# Patient Record
Sex: MALE | Race: WHITE | NOT HISPANIC OR LATINO | Employment: FULL TIME | URBAN - METROPOLITAN AREA
[De-identification: names, ages, dates, MRNs, and addresses within clinical notes are randomized per-mention and may not be internally consistent; named-entity substitution may affect disease eponyms.]

---

## 2017-07-13 ENCOUNTER — OFFICE VISIT (OUTPATIENT)
Dept: MEDICAL GROUP | Facility: MEDICAL CENTER | Age: 47
End: 2017-07-13
Payer: COMMERCIAL

## 2017-07-13 VITALS
DIASTOLIC BLOOD PRESSURE: 72 MMHG | HEART RATE: 68 BPM | WEIGHT: 199 LBS | OXYGEN SATURATION: 97 % | BODY MASS INDEX: 23.02 KG/M2 | HEIGHT: 78 IN | RESPIRATION RATE: 16 BRPM | SYSTOLIC BLOOD PRESSURE: 128 MMHG

## 2017-07-13 DIAGNOSIS — H69.91 EUSTACHIAN TUBE DYSFUNCTION, RIGHT: ICD-10-CM

## 2017-07-13 DIAGNOSIS — G89.29 CHRONIC FOOT PAIN, LEFT: ICD-10-CM

## 2017-07-13 DIAGNOSIS — M79.672 CHRONIC FOOT PAIN, LEFT: ICD-10-CM

## 2017-07-13 DIAGNOSIS — E78.5 DYSLIPIDEMIA: ICD-10-CM

## 2017-07-13 PROCEDURE — 99214 OFFICE O/P EST MOD 30 MIN: CPT | Performed by: NURSE PRACTITIONER

## 2017-07-13 RX ORDER — FLUTICASONE PROPIONATE 50 MCG
2 SPRAY, SUSPENSION (ML) NASAL DAILY
Qty: 16 G | Refills: 11 | Status: SHIPPED | OUTPATIENT
Start: 2017-07-13 | End: 2021-01-06

## 2017-07-13 RX ORDER — METHYLPREDNISOLONE 4 MG/1
TABLET ORAL
Qty: 21 TAB | Refills: 0 | Status: SHIPPED | OUTPATIENT
Start: 2017-07-13 | End: 2021-01-06

## 2017-07-13 ASSESSMENT — PATIENT HEALTH QUESTIONNAIRE - PHQ9: CLINICAL INTERPRETATION OF PHQ2 SCORE: 0

## 2017-07-13 NOTE — PROGRESS NOTES
"Subjective:      Chalo Mcneil is a 47 y.o. male who presents with Otalgia            Otalgia     Chalo Mcneil is here today for complaints of ear pain and foot pain.      1. Eustachian tube dysfunction, right  Patient states approximately 3 weeks ago he developed a \"weird\" feeling in his right ear. He states sometimes it has been painful although he states over the past 1-2 days and has improved. He has not tried anything over-the-counter for symptoms. He denies discharge from the ears or hearing loss. There may be some sinus congestion or postnasal drip. He denies fever, chills, cough or shortness of breath.    2. Chronic foot pain, left  Patient was seen in urgent care on 9/16 for left foot trauma. He was diagnosed with a closed, nondisplaced fracture of the second metacarpal bone of the left foot. He states he still is having pain in this area and it has impacted on his ability to exercise and would like to see a foot doctor for this.    3. Dyslipidemia  Patient previously had mild elevation in LDL cholesterol and is due for repeat lab work.    Social History   Substance Use Topics   • Smoking status: Never Smoker    • Smokeless tobacco: Never Used   • Alcohol Use: Yes      Comment: occas     Current Outpatient Prescriptions   Medication Sig Dispense Refill   • fluticasone (FLONASE) 50 MCG/ACT nasal spray Spray 2 Sprays in nose every day. 16 g 11   • MethylPREDNISolone (MEDROL DOSEPAK) 4 MG Tablet Therapy Pack As directed on the packaging label. 21 Tab 0   • aspirin (ASA) 325 MG Tab Take 325 mg by mouth every 6 hours as needed.       No current facility-administered medications for this visit.     Past Medical History   Diagnosis Date   • ASTHMA      childhood   • Seasonal allergies      fall   • Infertile male syndrome      Family History   Problem Relation Age of Onset   • Cancer Father      prostate   • Heart Disease Paternal Grandmother    • Cancer Paternal Grandfather      lung   • Diabetes Neg Hx    • Stroke " "Neg Hx    • Other Mother      healthy       Review of Systems   HENT: Positive for ear pain.    Musculoskeletal: Positive for joint pain.   All other systems reviewed and are negative.         Objective:     /72 mmHg  Pulse 68  Resp 16  Ht 2.007 m (6' 7.02\")  Wt 90.266 kg (199 lb)  BMI 22.41 kg/m2  SpO2 97%     Physical Exam   Constitutional: He is oriented to person, place, and time. He appears well-developed and well-nourished. No distress.   HENT:   Head: Normocephalic and atraumatic.   Right Ear: External ear normal. Tympanic membrane is injected and retracted.   Left Ear: External ear normal. Tympanic membrane is injected and retracted.   Nose: Nose normal.   Mouth/Throat: Oropharynx is clear and moist.   Eyes: Conjunctivae are normal. Right eye exhibits no discharge. Left eye exhibits no discharge.   Neck: Normal range of motion. Neck supple. No tracheal deviation present. No thyromegaly present.   Cardiovascular: Normal rate, regular rhythm and normal heart sounds.    No murmur heard.  Pulmonary/Chest: Effort normal and breath sounds normal. No respiratory distress. He has no wheezes. He has no rales.   Lymphadenopathy:     He has no cervical adenopathy.   Neurological: He is alert and oriented to person, place, and time. Coordination normal.   Skin: Skin is warm and dry. No rash noted. He is not diaphoretic. No erythema.   Psychiatric: He has a normal mood and affect. His behavior is normal. Judgment and thought content normal.   Nursing note and vitals reviewed.              Assessment/Plan:     1. Eustachian tube dysfunction, right  Patient is improving somewhat so I will have to first try Flonase nasal spray and possibly an over-the-counter antihistamine. If it does not start improving or worsens he is to start on the Medrol Dosepak. I do not see otitis media.  - fluticasone (FLONASE) 50 MCG/ACT nasal spray; Spray 2 Sprays in nose every day.  Dispense: 16 g; Refill: 11  - MethylPREDNISolone " (MEDROL DOSEPAK) 4 MG Tablet Therapy Pack; As directed on the packaging label.  Dispense: 21 Tab; Refill: 0    2. Chronic foot pain, left  Patient has been having pain in his foot for about 10 months now so I will refer him to podiatry for further evaluation.  - REFERRAL TO PODIATRY    3. Dyslipidemia  I advised patient to do repeat lab work and then if his LDL continues to be running above 140, I will discuss with him the pros and cons of going on statins.  - COMP METABOLIC PANEL; Future  - LIPID PROFILE; Future

## 2017-07-13 NOTE — MR AVS SNAPSHOT
"        Chalo Mcneil   2017 10:20 AM   Office Visit   MRN: [id number]    Department:  [address] [place] [address]   Dept Phone:  [phone]    Description:  Male : 1970   Provider:  RA[facility] Loco           Reason for Visit     Otalgia x 1 week      Allergies as of 2017     No Known Allergies      You were diagnosed with     Eustachian tube dysfunction, right   [617618]       Chronic foot pain, left   [549550]       Dyslipidemia   [055866]         Vital Signs     Blood Pressure Pulse Respirations Height Weight Body Mass Index    128/72 mmHg 68 16 2.007 m (6' 7.02\") 90.266 kg (199 lb) 22.41 kg/m2    Oxygen Saturation Smoking Status                97% Never Smoker           Basic Information     Date Of Birth Sex Race Ethnicity Preferred Language    1970 Male White Non- English      Problem List              ICD-10-CM Priority Class Noted - Resolved    Infertile male syndrome E34.52   Unknown - Present    Dyslipidemia E78.5   2017 - Present      Health Maintenance        Date Due Completion Dates    IMM DTaP/Tdap/Td Vaccine (1 - Tdap) 1989 ---    IMM INFLUENZA (1) 2017 ---            Current Immunizations     No immunizations on file.      Below and/or attached are the medications your provider expects you to take. Review all of your home medications and newly ordered medications with your provider and/or pharmacist. Follow medication instructions as directed by your provider and/or pharmacist. Please keep your medication list with you and share with your provider. Update the information when medications are discontinued, doses are changed, or new medications (including over-the-counter products) are added; and carry medication information at all times in the event of emergency situations     Allergies:  No Known Allergies          Medications  Valid as of: 2017 - 10:44 AM    Generic Name Brand Name Tablet Size Instructions for use    Aspirin (Tab)  MG Take " 325 mg by mouth every 6 hours as needed.        Fluticasone Propionate (Suspension) FLONASE 50 MCG/ACT Spray 2 Sprays in nose every day.        MethylPREDNISolone (Tablet Therapy Pack) MEDROL DOSEPAK 4 MG As directed on the packaging label.        .                 Medicines prescribed today were sent to:     [facility][id number] - [place] - [address] [place] [address]    Phone: [phone] Fax: [phone]    Open 24 Hours?: No      Medication refill instructions:       If your prescription bottle indicates you have medication refills left, it is not necessary to call your provider’s office. Please contact your pharmacy and they will refill your medication.    If your prescription bottle indicates you do not have any refills left, you may request refills at any time through one of the following ways: The online [employer] system (except Urgent Care), by calling your provider’s office, or by asking your pharmacy to contact your provider’s office with a refill request. Medication refills are processed only during regular business hours and may not be available until the next business day. Your provider may request additional information or to have a follow-up visit with you prior to refilling your medication.   *Please Note: Medication refills are assigned a new Rx number when refilled electronically. Your pharmacy may indicate that no refills were authorized even though a new prescription for the same medication is available at the pharmacy. Please request the medicine by name with the pharmacy before contacting your provider for a refill.        Your To Do List     Future Labs/Procedures Complete By Expires    COMP METABOLIC PANEL  As directed 7/14/2018    LIPID PROFILE  As directed 7/14/2018      Referral     A referral request has been sent to our patient care coordination department. Please allow 3-5 business days for us to process this request and contact you either by phone or mail. If you do not  hear from us by the 5th business day, please call us at [phone].           [email or web address] Access Code: Activation code not generated  Current [email or web address] Status: Active

## 2018-09-19 ENCOUNTER — OFFICE VISIT (OUTPATIENT)
Dept: URGENT CARE | Facility: CLINIC | Age: 48
End: 2018-09-19
Payer: COMMERCIAL

## 2018-09-19 VITALS
WEIGHT: 212 LBS | HEART RATE: 84 BPM | SYSTOLIC BLOOD PRESSURE: 110 MMHG | OXYGEN SATURATION: 96 % | RESPIRATION RATE: 16 BRPM | DIASTOLIC BLOOD PRESSURE: 76 MMHG | TEMPERATURE: 98.1 F | HEIGHT: 78 IN | BODY MASS INDEX: 24.53 KG/M2

## 2018-09-19 DIAGNOSIS — S01.01XA LACERATION OF SCALP, INITIAL ENCOUNTER: ICD-10-CM

## 2018-09-19 PROCEDURE — 90471 IMMUNIZATION ADMIN: CPT | Performed by: PHYSICIAN ASSISTANT

## 2018-09-19 PROCEDURE — 99213 OFFICE O/P EST LOW 20 MIN: CPT | Mod: 25 | Performed by: PHYSICIAN ASSISTANT

## 2018-09-19 PROCEDURE — 90715 TDAP VACCINE 7 YRS/> IM: CPT | Performed by: PHYSICIAN ASSISTANT

## 2018-09-19 RX ORDER — AZITHROMYCIN 250 MG/1
TABLET, FILM COATED ORAL
Qty: 6 TAB | Refills: 1 | Status: SHIPPED | OUTPATIENT
Start: 2018-09-19 | End: 2021-01-06

## 2018-09-19 ASSESSMENT — ENCOUNTER SYMPTOMS
EYES NEGATIVE: 1
NEUROLOGICAL NEGATIVE: 1
MUSCULOSKELETAL NEGATIVE: 1
CONSTITUTIONAL NEGATIVE: 1

## 2018-09-19 NOTE — PROGRESS NOTES
"Subjective:      Chalo Mcneil is a 48 y.o. male who presents with Scalp Laceration (xtoday, scalp laceration)            Laceration    The incident occurred 1 to 3 hours ago (hit in top scalp w/branch, bleeding; no LOC;no major head trama (prob glancing blow from stick); no other neuro sx). The laceration is located on the scalp. The laceration is 1 cm in size. The laceration mechanism was a blunt object. The pain is moderate. The pain has been constant since onset. He reports no foreign bodies present. His tetanus status is out of date.       Review of Systems   Constitutional: Negative.    HENT: Negative.    Eyes: Negative.    Musculoskeletal: Negative.    Skin: Negative.    Neurological: Negative.           Objective:     /76 (BP Location: Left arm, Patient Position: Sitting, BP Cuff Size: Large adult)   Pulse 84   Temp 36.7 °C (98.1 °F) (Temporal)   Resp 16   Ht 2.007 m (6' 7\")   Wt 96.2 kg (212 lb)   SpO2 96%   BMI 23.88 kg/m²      Physical Exam   Constitutional: He is oriented to person, place, and time. He appears well-developed and well-nourished. No distress.   HENT:   Head: Normocephalic.   Right Ear: External ear normal.   Left Ear: External ear normal.   Nose: Nose normal.   Top r scalp, no bleeding; cleaned off dried bld/hair w/hibiclens; , has ~1cm superfic.lac, not open, does not need closed; abx oint    Eyes: Pupils are equal, round, and reactive to light. EOM are normal.   Neck: Normal range of motion. Neck supple.   Neurological: He is alert and oriented to person, place, and time.   Skin: Skin is warm and dry. Capillary refill takes less than 2 seconds. No erythema.   Psychiatric: He has a normal mood and affect. His behavior is normal.   Nursing note and vitals reviewed.    Active Ambulatory Problems     Diagnosis Date Noted   • Infertile male syndrome    • Dyslipidemia 07/13/2017     Resolved Ambulatory Problems     Diagnosis Date Noted   • No Resolved Ambulatory Problems     Past " Medical History:   Diagnosis Date   • ASTHMA    • Infertile male syndrome    • Seasonal allergies      Current Outpatient Prescriptions on File Prior to Visit   Medication Sig Dispense Refill   • fluticasone (FLONASE) 50 MCG/ACT nasal spray Spray 2 Sprays in nose every day. 16 g 11   • MethylPREDNISolone (MEDROL DOSEPAK) 4 MG Tablet Therapy Pack As directed on the packaging label. 21 Tab 0   • aspirin (ASA) 325 MG Tab Take 325 mg by mouth every 6 hours as needed.       No current facility-administered medications on file prior to visit.      Social History     Social History   • Marital status:      Spouse name: N/A   • Number of children: N/A   • Years of education: N/A     Occupational History   • Not on file.     Social History Main Topics   • Smoking status: Never Smoker   • Smokeless tobacco: Never Used   • Alcohol use Yes      Comment: occas   • Drug use: No   • Sexual activity: Yes     Partners: Female      Comment: , public healtlh prof     Other Topics Concern   • Not on file     Social History Narrative   • No narrative on file     Family History   Problem Relation Age of Onset   • Cancer Father         prostate   • Other Mother         healthy   • Heart Disease Paternal Grandmother    • Cancer Paternal Grandfather         lung   • Diabetes Neg Hx    • Stroke Neg Hx      Patient has no known allergies.              Assessment/Plan:     ·  superficial scalp lac      · Local wound care; rx meds; rtc prn if infected  · No head CT indic, no neuro sx/ha

## 2020-11-23 ENCOUNTER — HOSPITAL ENCOUNTER (OUTPATIENT)
Dept: LAB | Facility: MEDICAL CENTER | Age: 50
End: 2020-11-23
Attending: PEDIATRICS
Payer: COMMERCIAL

## 2020-11-23 LAB — COVID ORDER STATUS COVID19: NORMAL

## 2020-12-29 ENCOUNTER — NURSE TRIAGE (OUTPATIENT)
Dept: HEALTH INFORMATION MANAGEMENT | Facility: OTHER | Age: 50
End: 2020-12-29

## 2020-12-29 NOTE — TELEPHONE ENCOUNTER
Pt reports an increased heart rate for the past several weeks, I feel fine during the day time. I wake up with a surge of adrenaline and feel like my chest doesn't settle down, I feel like I can hear my heart beat in my ears. I had panic attacks several years ago, but it feels similar. Heart rate= 96.  Wife has been sick with many emergencies, I just thought I was worried, I keep thinking it will get better, but its been a month. This morning I started feeling an allergy like congestion/cough. Pt states he got tested for covid around thanksgiving because wife tested positive, family quarantined and waited a week to get tested, the whole family tested negative. Pt screened, [facility]    1. Caller Name: Chalo Mcneil                 Call Back Number: [phone]  [facility] PCP or Specialty Provider: Yes Rishabh Corona        2.  Has the patient previously tested positive for COVID-19? No    3.  In the last two weeks, has the patient had any new or worsening symptoms (not explained by alternative diagnosis)? No.    4.  Does patient have any comoribidities? None     5.  Has the patient had any known contact with someone who is suspected or confirmed to have COVID-19? no    Reason for Disposition  • Problems with anxiety or stress    Additional Information  • Negative: Passed out (i.e., lost consciousness, collapsed and was not responding)  • Negative: Shock suspected (e.g., cold/pale/clammy skin, too weak to stand, low BP, rapid pulse)  • Negative: Difficult to awaken or acting confused (e.g., disoriented, slurred speech)  • Negative: Visible sweat on face or sweat dripping down face  • Negative: Unable to walk, or can only walk with assistance (e.g., requires support)  • Negative: [1] Received SHOCK from implantable cardiac defibrillator AND [2] persisting symptoms (i.e., palpitations, lightheadedness)  • Negative: Sounds like a life-threatening emergency to the triager  • Negative: Chest pain  •  "Negative: Difficulty breathing  • Negative: Dizziness, lightheadedness, or weakness  • Negative: [1] Heart beating very rapidly (e.g., > 140 / minute) AND [2] present now  (Exception: during exercise)  • Negative: Heart beating very slowly (e.g., < 50 / minute)  (Exception: athlete)  • Negative: New or worsened shortness of breath with activity (dyspnea on exertion)  • Negative: Patient sounds very sick or weak to the triager  • Negative: [1] Heart beating very rapidly (e.g., > 140 / minute) AND [2] not present now  (Exception: during exercise)  • Negative: [1] Skipped or extra beat(s) AND [2] increases with exercise or exertion  • Negative: [1] Skipped or extra beat(s) AND [2] occurs 4 or more times per minute  • Negative: New or worsened ankle swelling  • Negative: History of heart disease  (i.e., heart attack, bypass surgery, angina, angioplasty, CHF) (Exception: brief heart beat symptoms that went away and now feels well)  • Negative: Age > 60 years (Exception: brief heart beat symptoms that went away and now feels well)  • Negative: Taking water pill (i.e., diuretic) or heart medication (e.g., digoxin)  • Negative: Wearing a \"holter monitor\" or \"cardiac event monitor\"  • Negative: [1] Received SHOCK from implantable cardiac defibrillator AND [2] now feels well  • Negative: History of hyperthyroidism or taking thyroid medication  • Negative: Known or suspected substance abuse (e.g., cocaine, alcohol abuse)  • Negative: [1] Palpitations AND [2] no improvement after using CARE ADVICE    Answer Assessment - Initial Assessment Questions  1. DESCRIPTION: \"Please describe your heart rate or heart beat that you are having\" (e.g., fast/slow, regular/irregular, skipped or extra beats, \"palpitations\")      At night, for the past month, I have been feeling like my heart is racing, and then it continues when I wake up.  2. ONSET: \"When did it start?\" (Minutes, hours or days)       Month ago  3. DURATION: \"How long does it " "last\" (e.g., seconds, minutes, hours)      Over night  4. PATTERN \"Does it come and go, or has it been constant since it started?\"  \"Does it get worse with exertion?\"   \"Are you feeling it now?\"      Constant for the past month at night, but does not feel any of this during the day.    5. TAP: \"Using your hand, can you tap out what you are feeling on a chair or table in front of you, so that I can hear?\" (Note: not all patients can do this)        Pt thinks the heart can be faint and then hard at times  6. HEART RATE: \"Can you tell me your heart rate?\" \"How many beats in 15 seconds?\"  (Note: not all patients can do this)        96  7. RECURRENT SYMPTOM: \"Have you ever had this before?\" If so, ask: \"When was the last time?\" and \"What happened that time?\"       About 10 years ago.  8. CAUSE: \"What do you think is causing the palpitations?\"      Not sure, my wife has been having a few emergencies this month  9. CARDIAC HISTORY: \"Do you have any history of heart disease?\" (e.g., heart attack, angina, bypass surgery, angioplasty, arrhythmia)       no  10. OTHER SYMPTOMS: \"Do you have any other symptoms?\" (e.g., dizziness, chest pain, sweating, difficulty breathing)        Sometimes I feel tightness or labored breathing at night  11. PREGNANCY: \"Is there any chance you are pregnant?\" \"When was your last menstrual period?\"        no    Protocols used: HEART RATE AND HEARTBEAT [id number]      "

## 2020-12-30 ENCOUNTER — TELEPHONE (OUTPATIENT)
Dept: MEDICAL GROUP | Facility: PHYSICIAN GROUP | Age: 50
End: 2020-12-30

## 2020-12-30 NOTE — TELEPHONE ENCOUNTER
ESTABLISHED PATIENT PRE-VISIT PLANNING     Patient was NOT contacted to complete PVP.     Note: Patient will not be contacted if there is no indication to call.     1.  Reviewed notes from the last few office visits within the medical group: Yes, 01/06/2021    2.  If any orders were placed at last visit or intended to be done for this visit (i.e. 6 mos follow-up), do we have Results/Consult Notes?         •  Labs - Labs were not ordered at last office visit.  Note: If patient appointment is for lab review and patient did not complete labs, check with provider if OK to reschedule patient until labs completed.       •  Imaging - Imaging was not ordered at last office visit.       •  Referrals - No referrals were ordered at last office visit.    3. Is this appointment scheduled as a Hospital Follow-Up? No    4.  Immunizations were updated in Epic using Reconcile Outside Information activity? Yes    5.  Patient is due for the following Health Maintenance Topics:   Health Maintenance Due   Topic Date Due   • COLONOSCOPY  01/22/2020   • IMM ZOSTER VACCINES (1 of 2) 01/22/2020     6.  AHA (Pulse8) form printed for Provider? N/A

## 2021-01-06 ENCOUNTER — OFFICE VISIT (OUTPATIENT)
Dept: MEDICAL GROUP | Facility: PHYSICIAN GROUP | Age: 51
End: 2021-01-06
Payer: COMMERCIAL

## 2021-01-06 VITALS
TEMPERATURE: 97.2 F | OXYGEN SATURATION: 98 % | SYSTOLIC BLOOD PRESSURE: 122 MMHG | HEART RATE: 100 BPM | DIASTOLIC BLOOD PRESSURE: 72 MMHG | RESPIRATION RATE: 16 BRPM | BODY MASS INDEX: 25.34 KG/M2 | WEIGHT: 219 LBS | HEIGHT: 78 IN

## 2021-01-06 DIAGNOSIS — Z91.09 ENVIRONMENTAL ALLERGIES: ICD-10-CM

## 2021-01-06 DIAGNOSIS — R00.2 HEART PALPITATIONS: ICD-10-CM

## 2021-01-06 DIAGNOSIS — Z76.89 ENCOUNTER TO ESTABLISH CARE: ICD-10-CM

## 2021-01-06 DIAGNOSIS — Z12.11 SCREENING FOR COLORECTAL CANCER: ICD-10-CM

## 2021-01-06 DIAGNOSIS — E34.52 INFERTILE MALE SYNDROME: ICD-10-CM

## 2021-01-06 DIAGNOSIS — Z13.0 SCREENING FOR ENDOCRINE, METABOLIC AND IMMUNITY DISORDER: ICD-10-CM

## 2021-01-06 DIAGNOSIS — Z13.228 SCREENING FOR ENDOCRINE, METABOLIC AND IMMUNITY DISORDER: ICD-10-CM

## 2021-01-06 DIAGNOSIS — Z12.12 SCREENING FOR COLORECTAL CANCER: ICD-10-CM

## 2021-01-06 DIAGNOSIS — E78.5 DYSLIPIDEMIA: ICD-10-CM

## 2021-01-06 DIAGNOSIS — Z13.29 SCREENING FOR ENDOCRINE, METABOLIC AND IMMUNITY DISORDER: ICD-10-CM

## 2021-01-06 PROCEDURE — 99214 OFFICE O/P EST MOD 30 MIN: CPT | Performed by: NURSE PRACTITIONER

## 2021-01-06 SDOH — ECONOMIC STABILITY: HOUSING INSECURITY
IN THE LAST 12 MONTHS, WAS THERE A TIME WHEN YOU DID NOT HAVE A STEADY PLACE TO SLEEP OR SLEPT IN A SHELTER (INCLUDING NOW)?: NO

## 2021-01-06 SDOH — SOCIAL STABILITY: SOCIAL NETWORK: HOW OFTEN DO YOU GET TOGETHER WITH FRIENDS OR RELATIVES?: PATIENT DECLINED

## 2021-01-06 SDOH — ECONOMIC STABILITY: TRANSPORTATION INSECURITY
IN THE PAST 12 MONTHS, HAS THE LACK OF TRANSPORTATION KEPT YOU FROM MEDICAL APPOINTMENTS OR FROM GETTING MEDICATIONS?: NO

## 2021-01-06 SDOH — ECONOMIC STABILITY: INCOME INSECURITY: IN THE LAST 12 MONTHS, WAS THERE A TIME WHEN YOU WERE NOT ABLE TO PAY THE MORTGAGE OR RENT ON TIME?: NO

## 2021-01-06 SDOH — SOCIAL STABILITY: SOCIAL NETWORK: ARE YOU MARRIED, WIDOWED, DIVORCED, SEPARATED, NEVER MARRIED, OR LIVING WITH A PARTNER?: MARRIED

## 2021-01-06 SDOH — HEALTH STABILITY: MENTAL HEALTH: HOW OFTEN DO YOU HAVE 6 OR MORE DRINKS ON ONE OCCASION?: LESS THAN MONTHLY

## 2021-01-06 SDOH — HEALTH STABILITY: MENTAL HEALTH: HOW OFTEN DO YOU HAVE A DRINK CONTAINING ALCOHOL?: 2-4 TIMES A MONTH

## 2021-01-06 SDOH — ECONOMIC STABILITY: TRANSPORTATION INSECURITY
IN THE PAST 12 MONTHS, HAS LACK OF TRANSPORTATION KEPT YOU FROM MEETINGS, WORK, OR FROM GETTING THINGS NEEDED FOR DAILY LIVING?: NO

## 2021-01-06 SDOH — ECONOMIC STABILITY: HOUSING INSECURITY: IN THE LAST 12 MONTHS, HOW MANY PLACES HAVE YOU LIVED?: 1

## 2021-01-06 SDOH — SOCIAL STABILITY: SOCIAL NETWORK
DO YOU BELONG TO ANY CLUBS OR ORGANIZATIONS SUCH AS CHURCH GROUPS UNIONS, FRATERNAL OR ATHLETIC GROUPS, OR SCHOOL GROUPS?: YES

## 2021-01-06 SDOH — ECONOMIC STABILITY: INCOME INSECURITY: HOW HARD IS IT FOR YOU TO PAY FOR THE VERY BASICS LIKE FOOD, HOUSING, MEDICAL CARE, AND HEATING?: NOT HARD AT ALL

## 2021-01-06 SDOH — ECONOMIC STABILITY: FOOD INSECURITY: WITHIN THE PAST 12 MONTHS, THE FOOD YOU BOUGHT JUST DIDN'T LAST AND YOU DIDN'T HAVE MONEY TO GET MORE.: NEVER TRUE

## 2021-01-06 SDOH — HEALTH STABILITY: PHYSICAL HEALTH: ON AVERAGE, HOW MANY DAYS PER WEEK DO YOU ENGAGE IN MODERATE TO STRENUOUS EXERCISE (LIKE A BRISK WALK)?: 1 DAY

## 2021-01-06 SDOH — ECONOMIC STABILITY: FOOD INSECURITY: WITHIN THE PAST 12 MONTHS, YOU WORRIED THAT YOUR FOOD WOULD RUN OUT BEFORE YOU GOT MONEY TO BUY MORE.: NEVER TRUE

## 2021-01-06 SDOH — HEALTH STABILITY: MENTAL HEALTH
STRESS IS WHEN SOMEONE FEELS TENSE, NERVOUS, ANXIOUS, OR CAN'T SLEEP AT NIGHT BECAUSE THEIR MIND IS TROUBLED. HOW STRESSED ARE YOU?: VERY MUCH

## 2021-01-06 SDOH — HEALTH STABILITY: MENTAL HEALTH: HOW MANY STANDARD DRINKS CONTAINING ALCOHOL DO YOU HAVE ON A TYPICAL DAY?: 1 OR 2

## 2021-01-06 SDOH — HEALTH STABILITY: PHYSICAL HEALTH: ON AVERAGE, HOW MANY MINUTES DO YOU ENGAGE IN EXERCISE AT THIS LEVEL?: 30 MIN

## 2021-01-06 SDOH — HEALTH STABILITY: PHYSICAL HEALTH: ON AVERAGE, HOW MANY MINUTES DO YOU ENGAGE IN EXERCISE AT THIS LEVEL?: 30 MINUTES

## 2021-01-06 SDOH — SOCIAL STABILITY: SOCIAL NETWORK: HOW OFTEN DO YOU ATTENT MEETINGS OF THE CLUB OR ORGANIZATION YOU BELONG TO?: MORE THAN 4 TIMES PER YEAR

## 2021-01-06 SDOH — ECONOMIC STABILITY: TRANSPORTATION INSECURITY
IN THE PAST 12 MONTHS, HAS LACK OF RELIABLE TRANSPORTATION KEPT YOU FROM MEDICAL APPOINTMENTS, MEETINGS, WORK OR FROM GETTING THINGS NEEDED FOR DAILY LIVING?: NO

## 2021-01-06 SDOH — SOCIAL STABILITY: SOCIAL NETWORK
IN A TYPICAL WEEK, HOW MANY TIMES DO YOU TALK ON THE PHONE WITH FAMILY, FRIENDS, OR NEIGHBORS?: MORE THAN THREE TIMES A WEEK

## 2021-01-06 SDOH — SOCIAL STABILITY: SOCIAL NETWORK: HOW OFTEN DO YOU ATTEND CHURCH OR RELIGIOUS SERVICES?: 1 TO 4 TIMES PER YEAR

## 2021-01-06 ASSESSMENT — SOCIAL DETERMINANTS OF HEALTH (SDOH)
HOW OFTEN DO YOU GET TOGETHER WITH FRIENDS OR RELATIVES?: DECLINE
HOW OFTEN DO YOU HAVE A DRINK CONTAINING ALCOHOL: 2-3 TIMES A WEEK
WITHIN THE PAST 12 MONTHS, YOU WORRIED THAT YOUR FOOD WOULD RUN OUT BEFORE YOU GOT THE MONEY TO BUY MORE: NEVER TRUE
HOW MANY DRINKS CONTAINING ALCOHOL DO YOU HAVE ON A TYPICAL DAY WHEN YOU ARE DRINKING: 1 OR 2
WITHIN THE PAST 12 MONTHS, THE FOOD YOU BOUGHT JUST DIDN'T LAST AND YOU DIDN'T HAVE MONEY TO GET MORE: NEVER TRUE
IN A TYPICAL WEEK, HOW MANY TIMES DO YOU TALK ON THE PHONE WITH FAMILY, FRIENDS, OR NEIGHBORS?: MORE THAN THREE TIMES A WEEK
HOW OFTEN DO YOU ATTENT MEETINGS OF THE CLUB OR ORGANIZATION YOU BELONG TO?: MORE THAN 4 TIMES PER YEAR
HOW OFTEN DO YOU HAVE SIX OR MORE DRINKS ON ONE OCCASION: LESS THAN MONTHLY
DO YOU BELONG TO ANY CLUBS OR ORGANIZATIONS SUCH AS CHURCH GROUPS UNIONS, FRATERNAL OR ATHLETIC GROUPS, OR SCHOOL GROUPS?: YES
HOW OFTEN DO YOU ATTEND CHURCH OR RELIGIOUS SERVICES?: 1 TO 4 TIMES PER YEAR
HOW HARD IS IT FOR YOU TO PAY FOR THE VERY BASICS LIKE FOOD, HOUSING, MEDICAL CARE, AND HEATING?: NOT HARD AT ALL

## 2021-01-06 ASSESSMENT — PATIENT HEALTH QUESTIONNAIRE - PHQ9: CLINICAL INTERPRETATION OF PHQ2 SCORE: 0

## 2021-01-06 NOTE — PROGRESS NOTES
Subjective:     CC:  Diagnoses of Encounter to establish care, Heart palpitations, Dyslipidemia, Environmental allergies, Infertile male syndrome, Screening for colorectal cancer, and Screening for endocrine, metabolic and immunity disorder were pertinent to this visit.    HISTORY OF THE PRESENT ILLNESS: Patient is a 50 y.o. male. This pleasant patient is here today to establish care and discuss the following. His prior PCP was NII Bailey.    Dyslipidemia  Chronic medical problem. He is not on medication. He is diet controlled.  He is due for updated labs.  Last lab results:  Results for LETICIA TANNER (MRN [id number]) as of 1/6/2021 14:15   Ref. Range 2/22/2016 10:08   Cholesterol,Tot Latest Ref Range: 100 - 199 mg/dL 236 (H)   Triglycerides Latest Ref Range: 0 - 149 mg/dL 132   HDL Latest Ref Range: >=40 mg/dL 66   LDL Latest Ref Range: <100 mg/dL 144 (H)       Heart palpitations  New problem to examiner. For the last month he has noticed that he feels his heart pounding.  This occurs intermittently.  It occurs both with lying down and with exertion.  He has not been seen for this.  He denies chest pain, shortness of breath, dizziness, headaches, diaphoresis, left arm pain, or jaw pain.     Infertile male syndrome  Chronic medical problem.  He has had to use for his 2 children.    Environmental allergies  Chronic medical problem.  He takes fluticasone as needed.  He tries to avoid allergens.  No acute complaints today.      Allergies: Patient has no known allergies.    Current Outpatient Medications Ordered in Epic   Medication Sig Dispense Refill   • aspirin (ASA) 325 MG Tab Take 325 mg by mouth every 6 hours as needed.       No current Epic-ordered facility-administered medications on file.        Past Medical History:   Diagnosis Date   • ASTHMA     childhood   • Infertile male syndrome    • Seasonal allergies     fall       Past Surgical History:   Procedure Laterality Date   • HYDROCELECTOMY CHILD       "bilat   • VARICOCELECTOMY         Social History     Tobacco Use   • Smoking status: Never Smoker   • Smokeless tobacco: Never Used   Substance Use Topics   • Alcohol use: Yes     Frequency: 2-4 times a month     Drinks per session: 1 or 2     Binge frequency: Less than monthly     Comment: occas   • Drug use: No       Social History     Social History Narrative   • Not on file       Family History   Problem Relation Age of Onset   • Cancer Father 65        prostate   • Other Mother         healthy   • Heart Disease Paternal Grandmother    • Cancer Paternal Grandfather         lung   • No Known Problems Sister    • Diabetes Neg Hx    • Stroke Neg Hx        Health Maintenance: Due for shingles vaccine and colon cancer screening.    ROS:   Gen: no fevers/chills, no changes in weight  Eyes: no changes in vision  ENT: no sore throat, no ear pain  Pulm: no sob, no cough  CV: no chest pain, + palpitations  GI: no nausea/vomiting, no diarrhea  : no dysuria  MSk: no myalgias  Skin: no rash  Neuro: no headaches, no dizziness  Objective:     Vital signs reviewed  Exam: /72 (BP Location: Left arm, Patient Position: Sitting, BP Cuff Size: Adult)   Pulse 100   Temp 36.2 °C (97.2 °F) (Temporal)   Resp 16   Ht 2.007 m (6' 7\")   Wt 99.3 kg (219 lb)   SpO2 98%  Body mass index is 24.67 kg/m².    General: Normal appearing. No distress.  HENT: Normocephalic. Ears normal shape and contour, canals are clear bilaterally, tympanic membranes are benign.   Eyes: Eyes conjunctiva clear lids without ptosis, pupils equal and reactive to light accommodation, lids normal.  Glasses in place.  Neck: Supple without JVD. Thyroid is not enlarged.  Pulmonary: Clear to ausculation.  Normal effort. No rales, ronchi, or wheezing.  Cardiovascular: Regular rate and rhythm without murmur. Radial pulses are intact and equal bilaterally.  Abdomen: Soft, nontender, nondistended.  Neurologic: Grossly nonfocal  Lymph: No cervical or supraclavicular " lymph nodes are palpable  Skin: Warm and dry.  No obvious lesions.  Musculoskeletal: Normal gait. No extremity cyanosis, clubbing, or edema.  Psych: Normal mood and affect. Alert and oriented x3. Judgment and insight is normal.      Assessment & Plan:   50 y.o. male with the following -    1. Encounter to establish care  New problem to examiner. Care established.     2. Heart palpitations  New problem to examiner.  Check labs as below, rule out any anemia, thyroid abnormality, electrolyte deficiency, or kidney abnormality.  Referral placed to cardiology.  He is not having any acute chest pain or complaints today.  Red flags discussed with strict ER precautions, he verbalized understanding.  Follow-up pending labs.  Monitor and follow.  - Comp Metabolic Panel; Future  - TSH WITH REFLEX TO FT4; Future  - REFERRAL TO CARDIOLOGY    3. Dyslipidemia  New problem to examiner.  Continue diet and lifestyle modifications.  Due for updated labs as below, orders placed.  Monitor and follow.  - Lipid Profile; Future  - CBC WITH DIFFERENTIAL; Future  - Comp Metabolic Panel; Future    4. Environmental allergies  New problem to examiner.  Continue to avoid allergens.  Continue to use fluticasone if needed.  No acute complaints today.  Monitor and follow.    5. Infertile male syndrome  New problem to examiner.  No acute complaints today.  Monitor.    6. Screening for colorectal cancer  New problem to examiner.  Screening indicated.  Patient is in agreement.  Orders placed.  Monitor and follow.  - OCCULT BLOOD FECES IMMUNOASSAY; Future    7. Screening for endocrine, metabolic and immunity disorder  New problem to examiner.  Screening indicated.  Patient is in agreement.  Orders placed.  Monitor and follow.  - VITAMIN D,25 HYDROXY; Future      Return for pending labs.    Please note that this dictation was created using voice recognition software. I have made every reasonable attempt to correct obvious errors, but I expect that there  are errors of grammar and possibly content that I did not discover before finalizing the note.

## 2021-01-06 NOTE — ASSESSMENT & PLAN NOTE
Chronic medical problem. He is not on medication. He is diet controlled.  He is due for updated labs.  Last lab results:  Results for LETICIA TANNER (MRN [id number]) as of 1/6/2021 14:15   Ref. Range 2/22/2016 10:08   Cholesterol,Tot Latest Ref Range: 100 - 199 mg/dL 236 (H)   Triglycerides Latest Ref Range: 0 - 149 mg/dL 132   HDL Latest Ref Range: >=40 mg/dL 66   LDL Latest Ref Range: <100 mg/dL 144 (H)

## 2021-01-06 NOTE — ASSESSMENT & PLAN NOTE
Chronic medical problem.  He takes fluticasone as needed.  He tries to avoid allergens.  No acute complaints today.

## 2021-01-06 NOTE — ASSESSMENT & PLAN NOTE
New problem to examiner. For the last month he has noticed that he feels his heart pounding.  This occurs intermittently.  It occurs both with lying down and with exertion.  He has not been seen for this.  He denies chest pain, shortness of breath, dizziness, headaches, diaphoresis, left arm pain, or jaw pain.

## 2021-01-07 ENCOUNTER — HOSPITAL ENCOUNTER (OUTPATIENT)
Dept: LAB | Facility: MEDICAL CENTER | Age: 51
End: 2021-01-07
Attending: NURSE PRACTITIONER
Payer: COMMERCIAL

## 2021-01-07 DIAGNOSIS — R00.2 HEART PALPITATIONS: ICD-10-CM

## 2021-01-07 DIAGNOSIS — Z13.29 SCREENING FOR ENDOCRINE, METABOLIC AND IMMUNITY DISORDER: ICD-10-CM

## 2021-01-07 DIAGNOSIS — Z13.228 SCREENING FOR ENDOCRINE, METABOLIC AND IMMUNITY DISORDER: ICD-10-CM

## 2021-01-07 DIAGNOSIS — Z13.0 SCREENING FOR ENDOCRINE, METABOLIC AND IMMUNITY DISORDER: ICD-10-CM

## 2021-01-07 DIAGNOSIS — E78.5 DYSLIPIDEMIA: ICD-10-CM

## 2021-01-07 LAB
ALBUMIN SERPL BCP-MCNC: 4.4 G/DL (ref 3.2–4.9)
ALBUMIN/GLOB SERPL: 1.8 G/DL
ALP SERPL-CCNC: 64 U/L (ref 30–99)
ALT SERPL-CCNC: 38 U/L (ref 2–50)
ANION GAP SERPL CALC-SCNC: 10 MMOL/L (ref 7–16)
AST SERPL-CCNC: 31 U/L (ref 12–45)
BASOPHILS # BLD AUTO: 0.5 % (ref 0–1.8)
BASOPHILS # BLD: 0.03 K/UL (ref 0–0.12)
BILIRUB SERPL-MCNC: 0.3 MG/DL (ref 0.1–1.5)
BUN SERPL-MCNC: 9 MG/DL (ref 8–22)
CALCIUM SERPL-MCNC: 9 MG/DL (ref 8.5–10.5)
CHLORIDE SERPL-SCNC: 102 MMOL/L (ref 96–112)
CHOLEST SERPL-MCNC: 234 MG/DL (ref 100–199)
CO2 SERPL-SCNC: 24 MMOL/L (ref 20–33)
CREAT SERPL-MCNC: 0.94 MG/DL (ref 0.5–1.4)
EOSINOPHIL # BLD AUTO: 0.15 K/UL (ref 0–0.51)
EOSINOPHIL NFR BLD: 2.6 % (ref 0–6.9)
ERYTHROCYTE [DISTWIDTH] IN BLOOD BY AUTOMATED COUNT: 44.5 FL (ref 35.9–50)
FASTING STATUS PATIENT QL REPORTED: NORMAL
GLOBULIN SER CALC-MCNC: 2.5 G/DL (ref 1.9–3.5)
GLUCOSE SERPL-MCNC: 100 MG/DL (ref 65–99)
HCT VFR BLD AUTO: 48.6 % (ref 42–52)
HDLC SERPL-MCNC: 58 MG/DL
HGB BLD-MCNC: 15.6 G/DL (ref 14–18)
IMM GRANULOCYTES # BLD AUTO: 0 K/UL (ref 0–0.11)
IMM GRANULOCYTES NFR BLD AUTO: 0 % (ref 0–0.9)
LDLC SERPL CALC-MCNC: 153 MG/DL
LYMPHOCYTES # BLD AUTO: 1.39 K/UL (ref 1–4.8)
LYMPHOCYTES NFR BLD: 24 % (ref 22–41)
MCH RBC QN AUTO: 30.5 PG (ref 27–33)
MCHC RBC AUTO-ENTMCNC: 32.1 G/DL (ref 33.7–35.3)
MCV RBC AUTO: 95.1 FL (ref 81.4–97.8)
MONOCYTES # BLD AUTO: 0.48 K/UL (ref 0–0.85)
MONOCYTES NFR BLD AUTO: 8.3 % (ref 0–13.4)
NEUTROPHILS # BLD AUTO: 3.75 K/UL (ref 1.82–7.42)
NEUTROPHILS NFR BLD: 64.6 % (ref 44–72)
NRBC # BLD AUTO: 0 K/UL
NRBC BLD-RTO: 0 /100 WBC
PLATELET # BLD AUTO: 93 K/UL (ref 164–446)
PMV BLD AUTO: 11.5 FL (ref 9–12.9)
POTASSIUM SERPL-SCNC: 4.2 MMOL/L (ref 3.6–5.5)
PROT SERPL-MCNC: 6.9 G/DL (ref 6–8.2)
RBC # BLD AUTO: 5.11 M/UL (ref 4.7–6.1)
SODIUM SERPL-SCNC: 136 MMOL/L (ref 135–145)
TRIGL SERPL-MCNC: 116 MG/DL (ref 0–149)
WBC # BLD AUTO: 5.8 K/UL (ref 4.8–10.8)

## 2021-01-07 PROCEDURE — 85025 COMPLETE CBC W/AUTO DIFF WBC: CPT

## 2021-01-07 PROCEDURE — 80053 COMPREHEN METABOLIC PANEL: CPT

## 2021-01-07 PROCEDURE — 82306 VITAMIN D 25 HYDROXY: CPT

## 2021-01-07 PROCEDURE — 36415 COLL VENOUS BLD VENIPUNCTURE: CPT

## 2021-01-07 PROCEDURE — 84443 ASSAY THYROID STIM HORMONE: CPT

## 2021-01-07 PROCEDURE — 80061 LIPID PANEL: CPT

## 2021-01-08 DIAGNOSIS — E78.5 DYSLIPIDEMIA: ICD-10-CM

## 2021-01-08 DIAGNOSIS — E55.9 VITAMIN D INSUFFICIENCY: ICD-10-CM

## 2021-01-08 LAB
25(OH)D3 SERPL-MCNC: 24 NG/ML (ref 30–100)
TSH SERPL DL<=0.005 MIU/L-ACNC: 1.02 UIU/ML (ref 0.38–5.33)

## 2021-01-08 RX ORDER — ERGOCALCIFEROL 1.25 MG/1
50000 CAPSULE ORAL
Qty: 8 CAP | Refills: 0 | Status: SHIPPED | OUTPATIENT
Start: 2021-01-08 | End: 2021-02-24

## 2021-01-08 NOTE — PROGRESS NOTES
Results for LETICIA TANNER (MRN [id number]) as of 1/8/2021 12:45   Ref. Range 1/7/2021 09:41   25-Hydroxy   Vitamin D 25 Latest Ref Range: 30 - 100 ng/mL 24 (L)     Start ergocalciferol x8 weeks.  Repeat vitamin D and lipid panel in 3 months.

## 2021-01-21 ENCOUNTER — OFFICE VISIT (OUTPATIENT)
Dept: CARDIOLOGY | Facility: MEDICAL CENTER | Age: 51
End: 2021-01-21
Payer: COMMERCIAL

## 2021-01-21 VITALS
BODY MASS INDEX: 25.14 KG/M2 | DIASTOLIC BLOOD PRESSURE: 80 MMHG | RESPIRATION RATE: 12 BRPM | OXYGEN SATURATION: 97 % | HEIGHT: 78 IN | WEIGHT: 217.3 LBS | SYSTOLIC BLOOD PRESSURE: 102 MMHG | HEART RATE: 80 BPM

## 2021-01-21 DIAGNOSIS — E78.5 DYSLIPIDEMIA: ICD-10-CM

## 2021-01-21 DIAGNOSIS — R00.2 HEART PALPITATIONS: ICD-10-CM

## 2021-01-21 LAB — EKG IMPRESSION: NORMAL

## 2021-01-21 PROCEDURE — 93000 ELECTROCARDIOGRAM COMPLETE: CPT | Performed by: INTERNAL MEDICINE

## 2021-01-21 PROCEDURE — 99204 OFFICE O/P NEW MOD 45 MIN: CPT | Performed by: INTERNAL MEDICINE

## 2021-01-21 ASSESSMENT — FIBROSIS 4 INDEX: FIB4 SCORE: 2.7

## 2021-01-21 NOTE — PROGRESS NOTES
Cardiology Initial Consultation Note    Date of note:    1/21/2021    Primary Care Provider: HEATHER Nino  Referring Provider: Jen Coleman A*      Patient Name: Chalo Mcneil   YOB: 1970  MRN:              [id number]    Chief Complaint: Palpitations, cardiovascular health maintenance    Chalo Mcneil is a 50 y.o. male  patient presented today with complaints of palpitations and cardiovascular health maintenance because he is turning 51 this year.  His grand mother had heart attack in her 70s.  No premature coronary artery disease in the family.  No chest pain, shortness of breath.  Decently active.  Exercise twice a week.  Feeling palpitations for last several days every night before he goes to bed, feels like heart thumping in his chest and he feels heartbeat in his neck and in his legs, he cannot sleep, feel anxious.  Not associated with chest pain, shortness of breath.  No fainting spells.    ROS    All other systems reviewed and discussed using a comprehensive questionnaire and are negative.     Past medical history, family history, social history, allergies and labs are reviewed and updated as needed as documented below.    Past Medical History:   Diagnosis Date   • ASTHMA     childhood   • Infertile male syndrome    • Seasonal allergies     fall         Past Surgical History:   Procedure Laterality Date   • HYDROCELECTOMY CHILD      bilat   • VARICOCELECTOMY           Current Outpatient Medications   Medication Sig Dispense Refill   • ergocalciferol (DRISDOL) 72819 UNIT capsule Take 1 Cap by mouth every 7 days. 8 Cap 0     No current facility-administered medications for this visit.          Allergies   Allergen Reactions   • Mixed Grasses    • Pet Dander [Cat Hair Extract]          Family History   Problem Relation Age of Onset   • Cancer Father 65        prostate   • Other Mother         healthy   • Heart Disease Paternal Grandmother    • Cancer  "Paternal Grandfather         lung   • No Known Problems Sister    • Diabetes Neg Hx    • Stroke Neg Hx          Social History     Socioeconomic History   • Marital status:      Spouse name: Not on file   • Number of children: Not on file   • Years of education: Not on file   • Highest education level: Doctorate   Occupational History   • Not on file   Social Needs   • Financial resource strain: Not hard at all   • Food insecurity     Worry: Never true     Inability: Never true   • Transportation needs     Medical: No     Non-medical: No   Tobacco Use   • Smoking status: Never Smoker   • Smokeless tobacco: Never Used   Substance and Sexual Activity   • Alcohol use: Yes     Frequency: 2-4 times a month     Drinks per session: 1 or 2     Binge frequency: Less than monthly     Comment: occas   • Drug use: No   • Sexual activity: Yes     Partners: [facility]     [facility]   Lifestyle   • Physical activity     Days per week: 1 day     Minutes per session: 30 min   • Stress: Very much   Relationships   • Social connections     Talks on phone: More than three times a week     Gets together: Patient refused     Attends Yarsanism service: 1 to 4 times per year     Active member of club or organization: Yes     Attends meetings of clubs or organizations: More than 4 times per year     Relationship status:    • Intimate partner violence     Fear of current or ex partner: Not on file     Emotionally abused: Not on file     Physically abused: Not on file     Forced sexual activity: Not on file   Other Topics Concern   • Not on file   Social History Narrative   • Not on file         Physical Exam:  Ambulatory Vitals  /80 (BP Location: Left arm, Patient Position: Sitting, BP Cuff Size: Adult)   Pulse 80   Resp 12   Ht 2.007 m (6' 7\")   Wt 98.6 kg (217 lb 4.8 oz)   SpO2 97%    Oxygen Therapy:  Pulse Oximetry: 97 %  BP Readings from Last 4 Encounters:   01/21/21 102/80   01/06/21 " 122/72   18 110/76   17 128/72       Weight/BMI: Body mass index is 24.48 kg/m².  Wt Readings from Last 4 Encounters:   21 98.6 kg (217 lb 4.8 oz)   21 99.3 kg (219 lb)   18 96.2 kg (212 lb)   17 90.3 kg (199 lb)       General: Well appearing and in no apparent distress  Head: atrumatic  Eyes: No conjunctival pallor   ENT: normal external appearance of nose and ears  Neck: JVD absent, carotid bruits absent  Lungs: respiratory sounds  normal, additional breath sounds absent  Heart: Regular rhythm,   No palpable thrills on palpation, murmurs absent, no rubs,   Lower extremity edema absent.   Pedal pulses normal  Abdomen: soft, non tender, non distended.  Extremities/MSK: no clubbing, no cyanosis  Neurological: normal orientation, Gait normal   Psychiatric: Appropriate affect, intact judgement and insight  Skin: Warm extremities      Lab Data Review:  Lab Results   Component Value Date/Time    CHOLSTRLTOT 234 (H) 2021 09:41 AM     (H) 2021 09:41 AM    HDL 58 2021 09:41 AM    TRIGLYCERIDE 116 2021 09:41 AM       Lab Results   Component Value Date/Time    SODIUM 136 2021 09:41 AM    POTASSIUM 4.2 2021 09:41 AM    CHLORIDE 102 2021 09:41 AM    CO2 24 2021 09:41 AM    GLUCOSE 100 (H) 2021 09:41 AM    BUN 9 2021 09:41 AM    CREATININE 0.94 2021 09:41 AM     Lab Results   Component Value Date/Time    ALKPHOSPHAT 64 2021 09:41 AM    ASTSGOT 31 2021 09:41 AM    ALTSGPT 38 2021 09:41 AM    TBILIRUBIN 0.3 2021 09:41 AM      Lab Results   Component Value Date/Time    WBC 5.8 2021 09:41 AM     EKG today shows sinus rhythm  Last LDL reviewed.  Notes from Ms. Coleman reviewed from 2021    Medical Decision Makin-year-old male patient with palpitations, dyslipidemia.  His palpitations really sounds like anxiety.  Recommend obtaining Apple Watch or Compass Engine device to monitor his EKG, if  there is an abnormality found, we will set up for formal heart monitor.  His LDL was 140, recommend dietary modifications, recheck LDL in 4 to 5 months, recommend taking red rice yeast extract supplements.  If LDL still remains high, consider statin therapy.    Return in about 1 year (around 1/21/2022).    This note was dictated using Dragon speech recognition software.    Edgar FARRELL  Interventional cardiologist  [facility], [facility]  [address] [address]  Phone: [phone]  Fax: [phone]

## 2021-02-24 DIAGNOSIS — E55.9 VITAMIN D INSUFFICIENCY: ICD-10-CM

## 2021-02-24 RX ORDER — ERGOCALCIFEROL 1.25 MG/1
CAPSULE ORAL
Qty: 4 CAPSULE | Refills: 1 | Status: SHIPPED | OUTPATIENT
Start: 2021-02-24 | End: 2022-02-10

## 2021-02-25 NOTE — TELEPHONE ENCOUNTER
Requested Prescriptions     Signed Prescriptions Disp Refills   • vitamin D, Ergocalciferol, (DRISDOL) 1.25 MG (43524 UT) Cap capsule 4 capsule 1     Sig: TAKE 1 CAPSULE BY MOUTH EVERY 7 DAYS     Authorizing Provider: LORETA LEZAMA A.P.R.N.

## 2022-02-10 ENCOUNTER — OFFICE VISIT (OUTPATIENT)
Dept: MEDICAL GROUP | Facility: PHYSICIAN GROUP | Age: 52
End: 2022-02-10
Payer: COMMERCIAL

## 2022-02-10 VITALS
HEIGHT: 78 IN | TEMPERATURE: 98.3 F | WEIGHT: 228 LBS | HEART RATE: 80 BPM | RESPIRATION RATE: 16 BRPM | BODY MASS INDEX: 26.38 KG/M2 | SYSTOLIC BLOOD PRESSURE: 118 MMHG | OXYGEN SATURATION: 96 % | DIASTOLIC BLOOD PRESSURE: 80 MMHG

## 2022-02-10 DIAGNOSIS — Z12.12 SCREENING FOR COLORECTAL CANCER: ICD-10-CM

## 2022-02-10 DIAGNOSIS — Z00.00 PREVENTATIVE HEALTH CARE: ICD-10-CM

## 2022-02-10 DIAGNOSIS — R00.2 HEART PALPITATIONS: ICD-10-CM

## 2022-02-10 DIAGNOSIS — G44.89 OTHER HEADACHE SYNDROME: ICD-10-CM

## 2022-02-10 DIAGNOSIS — Z12.11 SCREENING FOR COLORECTAL CANCER: ICD-10-CM

## 2022-02-10 DIAGNOSIS — E78.5 DYSLIPIDEMIA: ICD-10-CM

## 2022-02-10 PROCEDURE — 99214 OFFICE O/P EST MOD 30 MIN: CPT | Performed by: NURSE PRACTITIONER

## 2022-02-10 ASSESSMENT — FIBROSIS 4 INDEX: FIB4 SCORE: 2.81

## 2022-02-10 ASSESSMENT — PATIENT HEALTH QUESTIONNAIRE - PHQ9: CLINICAL INTERPRETATION OF PHQ2 SCORE: 0

## 2022-02-10 NOTE — ASSESSMENT & PLAN NOTE
He reports has history of positional vertigo. The headache has been ongoing for 2 years. The right side of his head is where the headache occurs. The headache is intermittent. Aggravating factors include none. Alleviating factors include Advil. Denies photosensitivity, phonophobia, or aura.

## 2022-02-10 NOTE — ASSESSMENT & PLAN NOTE
He did follow-up with cardiology 1 year ago and thought related to anxiety. The palpations are noticeable when he is at rest. The palpations can occur at night time and day. No chest pain, shortness of breath or dizziness.

## 2022-02-10 NOTE — ASSESSMENT & PLAN NOTE
He is taking red yeast rice extract 2 capsules BID. He is not exercising regularly. He does like to ski.

## 2022-02-10 NOTE — PROGRESS NOTES
"Subjective:     CC: Headache and requesting labs    HPI:   Chalo presents today with the following:    Other headache syndrome  He reports has history of positional vertigo. The headache has been ongoing for 2 years. The right side of his head is where the headache occurs. The headache is intermittent. Aggravating factors include none. Alleviating factors include Advil. Denies photosensitivity, phonophobia, or aura.     Heart palpitations  He did follow-up with cardiology 1 year ago and thought related to anxiety. The palpations are noticeable when he is at rest. The palpations can occur at night time and day. No chest pain, shortness of breath or dizziness.     Dyslipidemia  He is taking red yeast rice extract 2 capsules BID. He is not exercising regularly. He does like to ski.       Past Medical History:   Diagnosis Date   • ASTHMA     childhood   • Infertile male syndrome    • Seasonal allergies     fall       Social History     Tobacco Use   • Smoking status: Never Smoker   • Smokeless tobacco: Never Used   Vaping Use   • Vaping Use: Never used   Substance Use Topics   • Alcohol use: Yes     Comment: occas   • Drug use: No       Current Outpatient Medications Ordered in Epic   Medication Sig Dispense Refill   • Red Yeast Rice Extract 600 MG Tab Take  by mouth 2 times a day.       No current [facility]-ordered facility-administered medications on file.       Allergies:  Mixed grasses and Pet dander [cat hair extract]    Health Maintenance: Due for colon cancer screening, Covid booster and zoster vaccine.  Discussed that he can complete his zoster vaccination in his pharmacy.      Objective:     Vital signs reviewed   Exam:  /80 (BP Location: Right arm, Patient Position: Sitting, BP Cuff Size: Adult)   Pulse 80   Temp 36.8 °C (98.3 °F) (Temporal)   Resp 16   Ht 2.007 m (6' 7\")   Wt 103 kg (228 lb)   SpO2 96%   BMI 25.69 kg/m²  Body mass index is 25.69 kg/m².    Gen: Alert and oriented, No apparent " distress.  Eyes:   Lids normal. Glasses in place.   Lungs: Normal effort, CTA bilaterally, no wheezes, rhonchi, or rales  CV: Regular rate and rhythm. No murmurs, rubs, or gallops.  Ext: No clubbing, cyanosis, edema.      Assessment & Plan:     52 y.o. male with the following -     1. Other headache syndrome  Chronic stable problem.  We discussed starting a headache diary to see if we can find any triggers.  He verbalized understanding.  We are checking updated labs.  He will return in 2-3 weeks for follow-up.    2. Heart palpitations  Chronic stable problem.  He did not follow-up after cardiology visit.  Will check updated labs for any thyroid dysfunction, vitamin deficiency, anemia, electrolyte abnormality or kidney dysfunction.  Discussed red flags when having palpitations which he currently does not have.  He will return in 2-3 weeks for follow-up.  - TSH WITH REFLEX TO FT4; Future  - VITAMIN D,25 HYDROXY; Future    3. Dyslipidemia  Chronic stable problem.  He will continue with his red yeast rice extract.  Due for updated labs.    4. Preventative health care  Chronic stable problem.  Due for updated labs.  He will return in the next 2-3 weeks for follow-up on his lab results.  - CBC WITH DIFFERENTIAL; Future  - Comp Metabolic Panel; Future  - Lipid Profile; Future  - URINALYSIS,CULTURE IF INDICATED; Future    5. Screening for colorectal cancer  Acute uncomplicated problem.  We discussed colon cancer screening.  He is interested in his colonoscopy.  Order placed.  - Referral to GI for Colonoscopy.      Return in about 3 weeks (around 3/3/2022) for Labs.    Please note that this dictation was created using voice recognition software. I have made every reasonable attempt to correct obvious errors, but I expect that there are errors of grammar and possibly content that I did not discover before finalizing the note.

## 2022-03-03 ENCOUNTER — APPOINTMENT (OUTPATIENT)
Dept: MEDICAL GROUP | Facility: PHYSICIAN GROUP | Age: 52
End: 2022-03-03
Payer: COMMERCIAL

## 2022-03-07 ENCOUNTER — TELEMEDICINE (OUTPATIENT)
Dept: MEDICAL GROUP | Facility: PHYSICIAN GROUP | Age: 52
End: 2022-03-07
Payer: COMMERCIAL

## 2022-03-07 VITALS — BODY MASS INDEX: 26.38 KG/M2 | HEIGHT: 78 IN | WEIGHT: 228 LBS | RESPIRATION RATE: 18 BRPM

## 2022-03-07 DIAGNOSIS — E78.5 DYSLIPIDEMIA: ICD-10-CM

## 2022-03-07 DIAGNOSIS — E55.9 VITAMIN D INSUFFICIENCY: ICD-10-CM

## 2022-03-07 PROCEDURE — 99213 OFFICE O/P EST LOW 20 MIN: CPT | Mod: 95 | Performed by: NURSE PRACTITIONER

## 2022-03-07 ASSESSMENT — FIBROSIS 4 INDEX: FIB4 SCORE: 2.81

## 2022-03-07 NOTE — PROGRESS NOTES
"Virtual Visit: Established Patient   This visit was conducted via Zoom using secure and encrypted videoconferencing technology.   The patient was in their home in the [facility].    The patient's identity was confirmed and verbal consent was obtained for this virtual visit.     Subjective:   CC:   Chief Complaint   Patient presents with   • Lab Results       Chalo Mcneil is a 52 y.o. male presenting for evaluation and management of:    Vitamin D insufficiency  He does not take any supplementation.  His recent labs showed a vitamin D level of 21 ng/mL.    Dyslipidemia  He is drinking 1-2 times a week, 1 alcohol drink. He is not exercising regularly. He does ski once a week. He continues with red yeast rice extract 600 mg twice daily. He tries to eat in moderation. His red meat intake is rare. He does like cheese and is eating daily. He has been trying to eat gluten free as he was having a rash. His ASCVD risk score is 4.9%.  His recent labs show total cholesterol of 270 mg/dL, triglycerides 154 mg/dL, HDL 55 mg/dL, and  mg/dL.  His total cholesterol and LDL have increased since labs from 1 year ago.        ROS   Denies any chest pain, shortness of breath, dizziness, blurry vision, or headaches.    Current medicines (including changes today)  Current Outpatient Medications   Medication Sig Dispense Refill   • Red Yeast Rice Extract 600 MG Tab Take  by mouth 2 times a day.       No current facility-administered medications for this visit.       Patient Active Problem List    Diagnosis Date Noted   • Other headache syndrome 02/10/2022   • Vitamin D insufficiency 01/08/2021   • Environmental allergies 01/06/2021   • Heart palpitations 01/06/2021   • Dyslipidemia 07/13/2017   • Infertile male syndrome         Objective:   Resp 18   Ht 2.007 m (6' 7\")   Wt 103 kg (228 lb)   BMI 25.69 kg/m²   Vital signs reviewed    Physical Exam:  Constitutional: Alert, no distress, well-groomed.  Skin: No rashes in " visible areas.  Eye: Round. Conjunctiva clear, lids normal. No icterus.  Glasses in place.  ENMT: Lips pink without lesions, good dentition, moist mucous membranes. Phonation normal.  Neck: No masses, no thyromegaly. Moves freely without pain.  Respiratory: Unlabored respiratory effort, no cough or audible wheeze  Psych: Alert and oriented x3, normal affect and mood.     Assessment and Plan:   The following treatment plan was discussed:     1. Dyslipidemia  Chronic exacerbated problem.  Discussed and reviewed his recent CBC, CMP, urinalysis, lipid panel, and vitamin D level.  Reinforced diet and lifestyle modifications.  He will continue with his red yeast rice extract 60 mg twice daily.  We discussed repeating labs in 3 months around June 2022.  We discussed if his labs continue to increase can consider cholesterol medication, he verbalized understanding.  Discussed and reviewed his ASCVD risk score.  He will schedule appointment after he completes his labs around June 2022.  - Lipid Profile; Future    2. Vitamin D insufficiency  Chronic exacerbated problem.  Discussed and reviewed his recent lab results.  We discussed adding vitamin D supplementation 2000 units daily and rechecking labs in 3 months around June 2022.  - VITAMIN D,25 HYDROXY; Future      Follow-up: Return in about 3 months (around 6/7/2022) for Labs.    Please note that this dictation was created using voice recognition software. I have made every reasonable attempt to correct obvious errors, but I expect that there are errors of grammar and possibly content that I did not discover before finalizing the note.

## 2022-03-07 NOTE — ASSESSMENT & PLAN NOTE
He is drinking 1-2 times a week, 1 alcohol drink. He is not exercising regularly. He does ski once a week. He continues with red yeast rice extract 600 mg twice daily. He tries to eat in moderation. His red meat intake is rare. He does like cheese and is eating daily. He has been trying to eat gluten free as he was having a rash. His ASCVD risk score is 4.9%.  His recent labs show total cholesterol of 270 mg/dL, triglycerides 154 mg/dL, HDL 55 mg/dL, and  mg/dL.  His total cholesterol and LDL have increased since labs from 1 year ago.

## 2024-07-17 ENCOUNTER — APPOINTMENT (RX ONLY)
Dept: URBAN - METROPOLITAN AREA CLINIC 6 | Facility: CLINIC | Age: 54
Setting detail: DERMATOLOGY
End: 2024-07-17

## 2024-07-17 DIAGNOSIS — I78.8 OTHER DISEASES OF CAPILLARIES: ICD-10-CM | Status: STABLE

## 2024-07-17 DIAGNOSIS — L20.89 OTHER ATOPIC DERMATITIS: ICD-10-CM | Status: INADEQUATELY CONTROLLED

## 2024-07-17 DIAGNOSIS — L81.4 OTHER MELANIN HYPERPIGMENTATION: ICD-10-CM

## 2024-07-17 DIAGNOSIS — D18.0 HEMANGIOMA: ICD-10-CM

## 2024-07-17 DIAGNOSIS — D22 MELANOCYTIC NEVI: ICD-10-CM

## 2024-07-17 DIAGNOSIS — L57.0 ACTINIC KERATOSIS: ICD-10-CM

## 2024-07-17 DIAGNOSIS — Z71.89 OTHER SPECIFIED COUNSELING: ICD-10-CM

## 2024-07-17 DIAGNOSIS — L82.1 OTHER SEBORRHEIC KERATOSIS: ICD-10-CM

## 2024-07-17 PROBLEM — D18.01 HEMANGIOMA OF SKIN AND SUBCUTANEOUS TISSUE: Status: ACTIVE | Noted: 2024-07-17

## 2024-07-17 PROBLEM — D22.5 MELANOCYTIC NEVI OF TRUNK: Status: ACTIVE | Noted: 2024-07-17

## 2024-07-17 PROCEDURE — ? SUNSCREEN TREATMENT REGIMEN

## 2024-07-17 PROCEDURE — ? COUNSELING

## 2024-07-17 PROCEDURE — 99204 OFFICE O/P NEW MOD 45 MIN: CPT | Mod: 25

## 2024-07-17 PROCEDURE — ? PRESCRIPTION

## 2024-07-17 PROCEDURE — ? LIQUID NITROGEN

## 2024-07-17 PROCEDURE — 17000 DESTRUCT PREMALG LESION: CPT

## 2024-07-17 RX ORDER — CLOBETASOL PROPIONATE 0.5 MG/G
1 CREAM TOPICAL BID
Qty: 60 | Refills: 3 | Status: ERX | COMMUNITY
Start: 2024-07-17

## 2024-07-17 RX ADMIN — CLOBETASOL PROPIONATE 1: 0.5 CREAM TOPICAL at 00:00

## 2024-07-17 ASSESSMENT — LOCATION SIMPLE DESCRIPTION DERM
LOCATION SIMPLE: NOSE
LOCATION SIMPLE: RIGHT FOOT
LOCATION SIMPLE: RIGHT FOREHEAD
LOCATION SIMPLE: RIGHT PRETIBIAL REGION
LOCATION SIMPLE: LEFT FOREHEAD
LOCATION SIMPLE: UPPER BACK
LOCATION SIMPLE: ABDOMEN
LOCATION SIMPLE: CHEST
LOCATION SIMPLE: LEFT PRETIBIAL REGION
LOCATION SIMPLE: LEFT HAND
LOCATION SIMPLE: RIGHT HAND

## 2024-07-17 ASSESSMENT — LOCATION ZONE DERM
LOCATION ZONE: NOSE
LOCATION ZONE: HAND
LOCATION ZONE: FEET
LOCATION ZONE: LEG
LOCATION ZONE: TRUNK
LOCATION ZONE: FACE

## 2024-07-17 ASSESSMENT — LOCATION DETAILED DESCRIPTION DERM
LOCATION DETAILED: LEFT INFERIOR FOREHEAD
LOCATION DETAILED: EPIGASTRIC SKIN
LOCATION DETAILED: LEFT ULNAR DORSAL HAND
LOCATION DETAILED: NASAL DORSUM
LOCATION DETAILED: STERNUM
LOCATION DETAILED: RIGHT DISTAL PRETIBIAL REGION
LOCATION DETAILED: LEFT DISTAL PRETIBIAL REGION
LOCATION DETAILED: RIGHT RADIAL DORSAL HAND
LOCATION DETAILED: RIGHT FOREHEAD
LOCATION DETAILED: PERIUMBILICAL SKIN
LOCATION DETAILED: RIGHT DORSAL FOOT
LOCATION DETAILED: INFERIOR THORACIC SPINE

## 2024-07-17 NOTE — PROCEDURE: LIQUID NITROGEN
Render Note In Bullet Format When Appropriate: No
Detail Level: Detailed
Show Applicator Variable?: Yes
Consent: The patient's verbal consent was obtained including but not limited to risks of crusting, scabbing, blistering, scarring, darker or lighter pigmentary change, recurrence, incomplete removal and infection.
Number Of Freeze-Thaw Cycles: 2 freeze-thaw cycles
Duration Of Freeze Thaw-Cycle (Seconds): 10
Post-Care Instructions: I reviewed with the patient in detail post-care instructions. Patient is to wear sunprotection, and avoid picking at any of the treated lesions. Pt may apply Vaseline to crusted or scabbing areas.